# Patient Record
Sex: FEMALE | Race: WHITE | Employment: FULL TIME | URBAN - NONMETROPOLITAN AREA
[De-identification: names, ages, dates, MRNs, and addresses within clinical notes are randomized per-mention and may not be internally consistent; named-entity substitution may affect disease eponyms.]

---

## 2022-10-04 ENCOUNTER — HOSPITAL ENCOUNTER (OUTPATIENT)
Age: 42
Discharge: HOME | End: 2022-10-04
Payer: COMMERCIAL

## 2022-10-04 ENCOUNTER — OFFICE VISIT (OUTPATIENT)
Dept: FAMILY MEDICINE CLINIC | Age: 42
End: 2022-10-04
Payer: COMMERCIAL

## 2022-10-04 ENCOUNTER — HOSPITAL ENCOUNTER (OUTPATIENT)
Dept: GENERAL RADIOLOGY | Age: 42
Discharge: HOME | End: 2022-10-04
Payer: COMMERCIAL

## 2022-10-04 VITALS
SYSTOLIC BLOOD PRESSURE: 110 MMHG | HEIGHT: 68 IN | DIASTOLIC BLOOD PRESSURE: 62 MMHG | BODY MASS INDEX: 25.23 KG/M2 | WEIGHT: 166.5 LBS | RESPIRATION RATE: 18 BRPM | HEART RATE: 64 BPM

## 2022-10-04 DIAGNOSIS — N92.6 IRREGULAR MENSTRUAL BLEEDING: ICD-10-CM

## 2022-10-04 DIAGNOSIS — R30.0 DYSURIA: ICD-10-CM

## 2022-10-04 DIAGNOSIS — R10.84 GENERALIZED ABDOMINAL PAIN: Primary | ICD-10-CM

## 2022-10-04 DIAGNOSIS — R53.83 FATIGUE, UNSPECIFIED TYPE: ICD-10-CM

## 2022-10-04 DIAGNOSIS — E55.9 VITAMIN D DEFICIENCY: ICD-10-CM

## 2022-10-04 DIAGNOSIS — Z13.220 SCREENING FOR LIPID DISORDERS: ICD-10-CM

## 2022-10-04 DIAGNOSIS — R10.84 GENERALIZED ABDOMINAL PAIN: ICD-10-CM

## 2022-10-04 LAB
BILIRUBIN, POC: NEGATIVE
BLOOD URINE, POC: NORMAL
CLARITY, POC: CLEAR
COLOR, POC: YELLOW
GLUCOSE URINE, POC: NEGATIVE
KETONES, POC: 1.02
LEUKOCYTE EST, POC: NORMAL
NITRITE, POC: NEGATIVE
PH, POC: 6.5
PROTEIN, POC: NEGATIVE
SPECIFIC GRAVITY, POC: 1.02
UROBILINOGEN, POC: 0.2

## 2022-10-04 PROCEDURE — 81002 URINALYSIS NONAUTO W/O SCOPE: CPT | Performed by: NURSE PRACTITIONER

## 2022-10-04 PROCEDURE — 74018 RADEX ABDOMEN 1 VIEW: CPT

## 2022-10-04 PROCEDURE — 99204 OFFICE O/P NEW MOD 45 MIN: CPT | Performed by: NURSE PRACTITIONER

## 2022-10-04 RX ORDER — CIPROFLOXACIN 500 MG/1
500 TABLET, FILM COATED ORAL 2 TIMES DAILY
Qty: 14 TABLET | Refills: 0 | Status: SHIPPED | OUTPATIENT
Start: 2022-10-04 | End: 2022-10-11

## 2022-10-04 RX ORDER — OMEPRAZOLE 40 MG/1
40 CAPSULE, DELAYED RELEASE ORAL
Qty: 90 CAPSULE | Refills: 1 | Status: SHIPPED | OUTPATIENT
Start: 2022-10-04

## 2022-10-04 SDOH — ECONOMIC STABILITY: FOOD INSECURITY: WITHIN THE PAST 12 MONTHS, YOU WORRIED THAT YOUR FOOD WOULD RUN OUT BEFORE YOU GOT MONEY TO BUY MORE.: NEVER TRUE

## 2022-10-04 SDOH — ECONOMIC STABILITY: FOOD INSECURITY: WITHIN THE PAST 12 MONTHS, THE FOOD YOU BOUGHT JUST DIDN'T LAST AND YOU DIDN'T HAVE MONEY TO GET MORE.: NEVER TRUE

## 2022-10-04 ASSESSMENT — SOCIAL DETERMINANTS OF HEALTH (SDOH): HOW HARD IS IT FOR YOU TO PAY FOR THE VERY BASICS LIKE FOOD, HOUSING, MEDICAL CARE, AND HEATING?: NOT HARD AT ALL

## 2022-10-04 ASSESSMENT — PATIENT HEALTH QUESTIONNAIRE - PHQ9
2. FEELING DOWN, DEPRESSED OR HOPELESS: 0
SUM OF ALL RESPONSES TO PHQ QUESTIONS 1-9: 0
1. LITTLE INTEREST OR PLEASURE IN DOING THINGS: 0
SUM OF ALL RESPONSES TO PHQ QUESTIONS 1-9: 0
SUM OF ALL RESPONSES TO PHQ9 QUESTIONS 1 & 2: 0
SUM OF ALL RESPONSES TO PHQ QUESTIONS 1-9: 0
SUM OF ALL RESPONSES TO PHQ QUESTIONS 1-9: 0

## 2022-10-04 ASSESSMENT — ENCOUNTER SYMPTOMS
WHEEZING: 0
SHORTNESS OF BREATH: 0
COLOR CHANGE: 0
ABDOMINAL PAIN: 1
NAUSEA: 1
BACK PAIN: 0
CONSTIPATION: 0
CHEST TIGHTNESS: 0
ABDOMINAL DISTENTION: 1
SORE THROAT: 0
VOMITING: 0
COUGH: 0
DIARRHEA: 0
SINUS PRESSURE: 0
STRIDOR: 0

## 2022-10-04 NOTE — PROGRESS NOTES
Faustina Abreu (:  1980) is a 37 y.o. female,New patient, here for evaluation of the following chief complaint(s): Annual Exam and Menstrual Problem (Constantly for 2 mo)         ASSESSMENT/PLAN:  1. Generalized abdominal pain  -     XR ABDOMEN (KUB) (SINGLE AP VIEW); Future  -     POCT Urinalysis no Micro  -     US PELVIS COMPLETE; Future  -     Culture, Urine  -     CBC with Auto Differential; Future  -     Comprehensive Metabolic Panel; Future  2. Irregular menstrual bleeding  -     US PELVIS COMPLETE; Future  3. Dysuria  -     Culture, Urine  4. Vitamin D deficiency  -     Vitamin D 25 Hydroxy; Future  5. Screening for lipid disorders  -     Lipid Panel; Future  6. Fatigue, unspecified type  -     TSH; Future    Obtain KUB  Obtain labs  Prilosec  Consider GI referral  UA showed small leuk  Cipro for UTI  Sent urine for culture  Obtain pelvic US  Return for pap in 1 month    Return in about 4 weeks (around 2022). Subjective   SUBJECTIVE/OBJECTIVE:  HPI    Moved recently [facility]. Moved for work. Bloating and epigastric pain. 2 days ago. Feels full. Morning feels fine but then if she eats anything it gets worse. No fevers. Otc gas x. Burning with urination. Started 4-5 days ago. Drinking water. Had UTI's in past.      Had heavy period. Also this last one did not stop. PAP a very long time ago. Irregular bleeding. Hx of vit d def. Not currently taking anything. Was on testosterone in the past for unknown reason. Review of Systems   Constitutional:  Negative for activity change, appetite change, chills, diaphoresis, fatigue, fever and unexpected weight change. HENT:  Negative for congestion, ear pain, postnasal drip, sinus pressure and sore throat. Eyes:  Negative for visual disturbance. Respiratory:  Negative for cough, chest tightness, shortness of breath, wheezing and stridor.     Cardiovascular:  Negative for chest pain, palpitations and leg swelling. Gastrointestinal:  Positive for abdominal distention, abdominal pain and nausea. Negative for constipation, diarrhea and vomiting. Endocrine: Negative for polydipsia, polyphagia and polyuria. Genitourinary:  Positive for dysuria, menstrual problem and vaginal bleeding. Negative for decreased urine volume, difficulty urinating, flank pain, frequency, hematuria, urgency, vaginal discharge and vaginal pain. Musculoskeletal:  Negative for arthralgias, back pain, gait problem, joint swelling, myalgias and neck pain. Skin:  Negative for color change, pallor and rash. Neurological:  Negative for dizziness, syncope, weakness, light-headedness, numbness and headaches. Hematological:  Negative for adenopathy. Psychiatric/Behavioral:  Negative for behavioral problems, self-injury and sleep disturbance. The patient is not nervous/anxious. Objective   Physical Exam  Vitals reviewed. Constitutional:       General: She is not in acute distress. Appearance: Normal appearance. She is well-developed. HENT:      Head: Normocephalic. Right Ear: Tympanic membrane and external ear normal.      Left Ear: Tympanic membrane and external ear normal.      Nose: Nose normal.      Right Sinus: No maxillary sinus tenderness. Left Sinus: No maxillary sinus tenderness. Mouth/Throat:      Mouth: Mucous membranes are moist.      Pharynx: Oropharynx is clear. Uvula midline. No oropharyngeal exudate or posterior oropharyngeal erythema. Neck:      Trachea: Trachea normal.   Cardiovascular:      Rate and Rhythm: Normal rate and regular rhythm. Heart sounds: Normal heart sounds. No murmur heard. Pulmonary:      Effort: Pulmonary effort is normal. No respiratory distress. Breath sounds: Normal breath sounds. No decreased breath sounds, wheezing, rhonchi or rales. Chest:      Chest wall: No tenderness. Abdominal:      General: There is no distension.       Palpations: Abdomen is soft. There is no mass. Tenderness: There is abdominal tenderness. Musculoskeletal:         General: No tenderness or deformity. Normal range of motion. Cervical back: Normal range of motion and neck supple. Lymphadenopathy:      Cervical: No cervical adenopathy. Skin:     General: Skin is warm and dry. Neurological:      Mental Status: She is alert and oriented to person, place, and time. Motor: No abnormal muscle tone. Coordination: Coordination normal.      Gait: Gait normal.   Psychiatric:         Mood and Affect: Mood normal.         Behavior: Behavior normal.         Thought Content: Thought content normal.         Judgment: Judgment normal.          On this date 10/4/2022 I have spent 45 minutes reviewing previous notes, test results and face to face with the patient discussing the diagnosis and importance of compliance with the treatment plan as well as documenting on the day of the visit. An electronic signature was used to authenticate this note.     --ANTHONY Castaneda - CNP

## 2022-10-07 LAB
ORGANISM: ABNORMAL
ORGANISM: ABNORMAL
URINE CULTURE, ROUTINE: ABNORMAL

## 2022-10-10 RX ORDER — NITROFURANTOIN 25; 75 MG/1; MG/1
100 CAPSULE ORAL 2 TIMES DAILY
Qty: 14 CAPSULE | Refills: 0 | Status: SHIPPED | OUTPATIENT
Start: 2022-10-10 | End: 2022-10-17

## 2022-10-21 ENCOUNTER — HOSPITAL ENCOUNTER (OUTPATIENT)
Dept: ULTRASOUND IMAGING | Age: 42
Discharge: HOME | End: 2022-10-21
Payer: COMMERCIAL

## 2022-10-21 DIAGNOSIS — N92.6 IRREGULAR MENSTRUAL BLEEDING: ICD-10-CM

## 2022-10-21 DIAGNOSIS — R10.84 GENERALIZED ABDOMINAL PAIN: ICD-10-CM

## 2022-10-21 PROCEDURE — 76830 TRANSVAGINAL US NON-OB: CPT

## 2022-10-24 ENCOUNTER — PATIENT MESSAGE (OUTPATIENT)
Dept: FAMILY MEDICINE CLINIC | Age: 42
End: 2022-10-24

## 2022-10-24 DIAGNOSIS — N83.202 CYST OF LEFT OVARY: Primary | ICD-10-CM

## 2022-10-24 DIAGNOSIS — N85.8 MASS OF UTERUS: ICD-10-CM

## 2022-10-24 DIAGNOSIS — N83.202 CYST OF LEFT OVARY: ICD-10-CM

## 2022-10-24 DIAGNOSIS — N85.8 MASS OF UTERUS: Primary | ICD-10-CM

## 2022-10-26 ENCOUNTER — HOSPITAL ENCOUNTER (OUTPATIENT)
Age: 42
Discharge: HOME | End: 2022-10-26
Payer: COMMERCIAL

## 2022-10-26 DIAGNOSIS — E55.9 VITAMIN D DEFICIENCY: ICD-10-CM

## 2022-10-26 DIAGNOSIS — R53.83 FATIGUE, UNSPECIFIED TYPE: ICD-10-CM

## 2022-10-26 DIAGNOSIS — R10.84 GENERALIZED ABDOMINAL PAIN: ICD-10-CM

## 2022-10-26 DIAGNOSIS — Z13.220 SCREENING FOR LIPID DISORDERS: ICD-10-CM

## 2022-10-26 LAB
ALBUMIN SERPL-MCNC: 4.2 G/DL (ref 3.5–5.1)
ALP BLD-CCNC: 41 U/L (ref 38–126)
ALT SERPL-CCNC: 10 U/L (ref 11–66)
ANION GAP SERPL CALCULATED.3IONS-SCNC: 10 MEQ/L (ref 8–16)
AST SERPL-CCNC: 16 U/L (ref 5–40)
BASOPHILS # BLD: 0.9 %
BASOPHILS ABSOLUTE: 0.1 THOU/MM3 (ref 0–0.1)
BILIRUB SERPL-MCNC: < 0.2 MG/DL (ref 0.3–1.2)
BUN BLDV-MCNC: 13 MG/DL (ref 7–22)
CALCIUM SERPL-MCNC: 8.9 MG/DL (ref 8.5–10.5)
CHLORIDE BLD-SCNC: 105 MEQ/L (ref 98–111)
CHOLESTEROL, TOTAL: 227 MG/DL (ref 100–199)
CO2: 26 MEQ/L (ref 23–33)
CREAT SERPL-MCNC: 0.8 MG/DL (ref 0.4–1.2)
EOSINOPHIL # BLD: 3.1 %
EOSINOPHILS ABSOLUTE: 0.2 THOU/MM3 (ref 0–0.4)
ERYTHROCYTE [DISTWIDTH] IN BLOOD BY AUTOMATED COUNT: 15.6 % (ref 11.5–14.5)
ERYTHROCYTE [DISTWIDTH] IN BLOOD BY AUTOMATED COUNT: 54 FL (ref 35–45)
GFR SERPL CREATININE-BSD FRML MDRD: > 60 ML/MIN/1.73M2
GLUCOSE BLD-MCNC: 79 MG/DL (ref 70–108)
HCT VFR BLD CALC: 41.2 % (ref 37–47)
HDLC SERPL-MCNC: 61 MG/DL
HEMOGLOBIN: 13.2 GM/DL (ref 12–16)
IMMATURE GRANS (ABS): 0.02 THOU/MM3 (ref 0–0.07)
IMMATURE GRANULOCYTES: 0.3 %
LDL CHOLESTEROL CALCULATED: 150 MG/DL
LYMPHOCYTES # BLD: 35.3 %
LYMPHOCYTES ABSOLUTE: 2.5 THOU/MM3 (ref 1–4.8)
MCH RBC QN AUTO: 30.1 PG (ref 26–33)
MCHC RBC AUTO-ENTMCNC: 32 GM/DL (ref 32.2–35.5)
MCV RBC AUTO: 94.1 FL (ref 81–99)
MONOCYTES # BLD: 7.7 %
MONOCYTES ABSOLUTE: 0.5 THOU/MM3 (ref 0.4–1.3)
NUCLEATED RED BLOOD CELLS: 0 /100 WBC
PLATELET # BLD: 317 THOU/MM3 (ref 130–400)
PMV BLD AUTO: 11.9 FL (ref 9.4–12.4)
POTASSIUM SERPL-SCNC: 4 MEQ/L (ref 3.5–5.2)
RBC # BLD: 4.38 MILL/MM3 (ref 4.2–5.4)
SEG NEUTROPHILS: 52.7 %
SEGMENTED NEUTROPHILS ABSOLUTE COUNT: 3.7 THOU/MM3 (ref 1.8–7.7)
SODIUM BLD-SCNC: 141 MEQ/L (ref 135–145)
TOTAL PROTEIN: 6.7 G/DL (ref 6.1–8)
TRIGL SERPL-MCNC: 80 MG/DL (ref 0–199)
TSH SERPL DL<=0.05 MIU/L-ACNC: 1.81 UIU/ML (ref 0.4–4.2)
VITAMIN D 25-HYDROXY: 23 NG/ML (ref 30–100)
WBC # BLD: 7.1 THOU/MM3 (ref 4.8–10.8)

## 2022-10-26 PROCEDURE — 84443 ASSAY THYROID STIM HORMONE: CPT

## 2022-10-26 PROCEDURE — 80053 COMPREHEN METABOLIC PANEL: CPT

## 2022-10-26 PROCEDURE — 36415 COLL VENOUS BLD VENIPUNCTURE: CPT

## 2022-10-26 PROCEDURE — 85025 COMPLETE CBC W/AUTO DIFF WBC: CPT

## 2022-10-26 PROCEDURE — 82306 VITAMIN D 25 HYDROXY: CPT

## 2022-10-26 PROCEDURE — 80061 LIPID PANEL: CPT

## 2022-11-14 ASSESSMENT — SOCIAL DETERMINANTS OF HEALTH (SDOH)
WITHIN THE LAST YEAR, HAVE YOU BEEN AFRAID OF YOUR PARTNER OR EX-PARTNER?: NO
WITHIN THE LAST YEAR, HAVE YOU BEEN KICKED, HIT, SLAPPED, OR OTHERWISE PHYSICALLY HURT BY YOUR PARTNER OR EX-PARTNER?: NO
WITHIN THE LAST YEAR, HAVE YOU BEEN HUMILIATED OR EMOTIONALLY ABUSED IN OTHER WAYS BY YOUR PARTNER OR EX-PARTNER?: NO
WITHIN THE LAST YEAR, HAVE TO BEEN RAPED OR FORCED TO HAVE ANY KIND OF SEXUAL ACTIVITY BY YOUR PARTNER OR EX-PARTNER?: NO

## 2022-11-16 ENCOUNTER — OFFICE VISIT (OUTPATIENT)
Dept: OBGYN | Age: 42
End: 2022-11-16
Payer: COMMERCIAL

## 2022-11-16 VITALS
OXYGEN SATURATION: 99 % | WEIGHT: 176.6 LBS | BODY MASS INDEX: 26.76 KG/M2 | HEIGHT: 68 IN | DIASTOLIC BLOOD PRESSURE: 78 MMHG | HEART RATE: 80 BPM | SYSTOLIC BLOOD PRESSURE: 124 MMHG

## 2022-11-16 DIAGNOSIS — Z80.41 FAMILY HISTORY OF MALIGNANT NEOPLASM OF OVARY IN FIRST DEGREE RELATIVE: ICD-10-CM

## 2022-11-16 DIAGNOSIS — Z98.890 H/O LEEP: ICD-10-CM

## 2022-11-16 DIAGNOSIS — N92.1 MENORRHAGIA WITH IRREGULAR CYCLE: Primary | ICD-10-CM

## 2022-11-16 DIAGNOSIS — Z80.49 FAMILY HISTORY OF MALIGNANT NEOPLASM OF UTERUS: ICD-10-CM

## 2022-11-16 DIAGNOSIS — D25.2 SUBSEROUS LEIOMYOMA OF UTERUS: ICD-10-CM

## 2022-11-16 DIAGNOSIS — N94.6 DYSMENORRHEA: ICD-10-CM

## 2022-11-16 DIAGNOSIS — N92.6 IRREGULAR MENSES: ICD-10-CM

## 2022-11-16 PROCEDURE — 99203 OFFICE O/P NEW LOW 30 MIN: CPT | Performed by: OBSTETRICS & GYNECOLOGY

## 2022-11-16 NOTE — PROGRESS NOTES
Binta Wallaceakbar  2022      Kim Thomas is a 37 y.o. female       The patient was seen today. She was here to follow-up regarding her labs and diagnostics ordered at her last visit for the diagnosis of:    ICD-10-CM    1. Menorrhagia with irregular cycle  N92.1 CBC with Auto Differential     TSH with Reflex     HCG, Quantitative, Pregnancy     Protime-INR     APTT     Hemoglobin A1C      2. Subserous and intramural leiomyoma of uterus  D25.2       3. Irregular menses  N92.6       4. Dysmenorrhea  N94.6       5. H/O LEEP at 16 yo  Z98.890       6. Family history of malignant neoplasm of ovary in first degree relative (MOTHER)  [address] [place] [address]      7. Family history of malignant neoplasm of uterus(SISTER @36 yo)  [address] [place] [address] 125          Her bowels are regular and she is voiding without difficulty. Pt with heavy irregular menses. Known fibroid uterus with ultrasound. Pt states current normal mammogram in [place]-unavailable will try to obtain. Pt has a significant family hx with a Mother living with Ovarian cancer Chemo and radiation tx and a Sister with Uterine Cancer at 44 yo living-chemo only. Pt instructed to try to get BRCA and Colaris results respectively. Literature given. Pt had a hx of a LEEP at 16 yo with delinquent GYN fu. No pap for more than 5 years. Symptoms of her abnomal menses are worsening over the last 18 months.       Past Medical History:   Diagnosis Date    Abnormal Pap smear of cervix          Past Surgical History:   Procedure Laterality Date    LEEP           Family History   Problem Relation Age of Onset    Cervical Cancer Mother     Cervical Cancer Sister          Social History     Tobacco Use    Smoking status: Every Day     Packs/day: 0.50     Types: Cigarettes    Smokeless tobacco: Never   Vaping Use    Vaping Use: Never used   Substance Use Topics    Alcohol use: Yes     Comment: socially Drug use: Never         MEDICATIONS:  Current Outpatient Medications   Medication Sig Dispense Refill    omeprazole (PRILOSEC) 40 MG delayed release capsule Take 1 capsule by mouth every morning (before breakfast) 90 capsule 1     No current facility-administered medications for this visit. ALLERGIES:  Allergies as of 11/16/2022    (No Known Allergies)       Review Of Systems (11 point):  Constitutional: No fever, chills or malaise; No weight change or fatigue  Head and Eyes: No vision changes, Headache, Dizziness or trauma in last 12 months  ENT ROS: No hearing, Tinnitis, sinus or taste problems  Hematological and Lymphatic ROS:No Lymphoma, Von Willebrand's, Hemophillia or Bleeding History  Psych ROS: No Depression, Homicidal thoughts,suicidal thoughts, or anxiety  Breast ROS: No breast abnormalities or lumps  Respiratory ROS: No SOB, Pneumoniae,Cough, or Pulmonary Embolism   Cardiovascular ROS: No Chest Pain with Exertion, Palpitations, Syncope, Edema, Arrhythmia  Gastrointestinal ROS: No Indigestion, Heartburn, Nausea, vomiting, Diarrhea, Constipation,or Bowel Changes; No Bloody Stools or melena  Genito-Urinary ROS: No Dysuria, Hematuria or Nocturia. No Urinary Incontinence or Vaginal Discharge; + irregular Menses  Musculoskeletal ROS: No Arthralgia, Arthritis,Gout,Osteoporosis or Rheumatism  Neurological ROS: No CVA, Migraines, Epilepsy, Seizure Hx, or Limb Weakness  Dermatological ROS: No Rash, Itching, Hives, Mole Changes or Cancer                                                        Blood pressure 124/78, pulse 80, height 5' 8\" (1.727 m), weight 176 lb 9.6 oz (80.1 kg), last menstrual period 11/06/2022, SpO2 99 %, not currently breastfeeding. Abdomen: Soft non-tender; good bowel sounds. No guarding, rebound or rigidity. No CVA tenderness bilaterally.     Extremities: No calf tenderness, DTR 2/4, and No edema bilaterally    Pelvic: Declined         Diagnostics:  US NON OB TRANSVAGINAL    Result Date: 10/21/2022  PROCEDURE: US NON OB TRANSVAGINAL CLINICAL INFORMATION: Generalized abdominal pain, Irregular menstrual bleeding . TECHNIQUE: Transvaginal ultrasound grayscale and color Doppler imaging COMPARISON: No prior study. FINDINGS: uterus is normal in size. There is however a 4.8 x 4.3 cm heterogeneous isoechoic mass involving the fundus. Slight mass effect on the endometrial canal. Endometrial stripe is normal thickness. No free fluid is seen in the cul-de-sac. The ovaries are normal in size and echogenicity. There is a 2 cm cyst in the left ovary. LMP-irregular - finished 10.03.2022 - lasted 3 wk or more   Uterus - 8.1 x 5.4 x 4.9 cm   Endometrium - 0.6 cm   Right Ovary - 2.2 x 2.8 x 1.4 cm   Left Ovary - 2.5 x 2.1 x 2 cm     Heterogeneous isoechoic mass uterine fundus suggesting a large fibroid. 2 cm cyst left ovary. **This report has been created using voice recognition software. It may contain minor errors which are inherent in voice recognition technology. ** Final report electronically signed by Dr. Hellen De La Paz on 10/21/2022 4:15 PM        Lab Results:  Results for orders placed or performed during the hospital encounter of 10/26/22   Vitamin D 25 Hydroxy   Result Value Ref Range    Vit D, 25-Hydroxy 23 (L) 30 - 100 ng/ml   TSH   Result Value Ref Range    TSH 1.810 0.400 - 4.200 uIU/mL   Comprehensive Metabolic Panel   Result Value Ref Range    Glucose 79 70 - 108 mg/dL    Creatinine 0.8 0.4 - 1.2 mg/dL    BUN 13 7 - 22 mg/dL    Sodium 141 135 - 145 meq/L    Potassium 4.0 3.5 - 5.2 meq/L    Chloride 105 98 - 111 meq/L    CO2 26 23 - 33 meq/L    Calcium 8.9 8.5 - 10.5 mg/dL    AST 16 5 - 40 U/L    Alkaline Phosphatase 41 38 - 126 U/L    Total Protein 6.7 6.1 - 8.0 g/dL    Albumin 4.2 3.5 - 5.1 g/dL    Total Bilirubin <0.2 (L) 0.3 - 1.2 mg/dL    ALT 10 (L) 11 - 66 U/L   CBC with Auto Differential   Result Value Ref Range    WBC 7.1 4.8 - 10.8 thou/mm3    RBC 4.38 4.20 - 5.40 mill/mm3    Hemoglobin 13.2 12.0 - 16.0 gm/dl    Hematocrit 41.2 37.0 - 47.0 %    MCV 94.1 81.0 - 99.0 fL    MCH 30.1 26.0 - 33.0 pg    MCHC 32.0 (L) 32.2 - 35.5 gm/dl    RDW-CV 15.6 (H) 11.5 - 14.5 %    RDW-SD 54.0 (H) 35.0 - 45.0 fL    Platelets 444 523 - 853 thou/mm3    MPV 11.9 9.4 - 12.4 fL    Seg Neutrophils 52.7 %    Lymphocytes 35.3 %    Monocytes 7.7 %    Eosinophils 3.1 %    Basophils 0.9 %    Immature Granulocytes 0.3 %    Segs Absolute 3.7 1.8 - 7.7 thou/mm3    Lymphocytes Absolute 2.5 1.0 - 4.8 thou/mm3    Monocytes Absolute 0.5 0.4 - 1.3 thou/mm3    Eosinophils Absolute 0.2 0.0 - 0.4 thou/mm3    Basophils Absolute 0.1 0.0 - 0.1 thou/mm3    Immature Grans (Abs) 0.02 0.00 - 0.07 thou/mm3    nRBC 0 /100 wbc   Lipid Panel   Result Value Ref Range    Cholesterol, Total 227 (H) 100 - 199 mg/dL    Triglycerides 80 0 - 199 mg/dL    HDL 61 mg/dL    LDL Calculated 150 mg/dL   Glomerular Filtration Rate, Estimated   Result Value Ref Range    Est, Glom Filt Rate >60 >60 ml/min/1.73m2   Anion Gap   Result Value Ref Range    Anion Gap 10.0 8.0 - 16.0 meq/L           Assessment:   Diagnosis Orders   1. Menorrhagia with irregular cycle  CBC with Auto Differential    TSH with Reflex    HCG, Quantitative, Pregnancy    Protime-INR    APTT    Hemoglobin A1C      2. Subserous and intramural leiomyoma of uterus        3. Irregular menses        4. Dysmenorrhea        5. H/O LEEP at 18 yo        6. Family history of malignant neoplasm of ovary in first degree relative (MOTHER)  [address] [place] [address]      7. Family history of malignant neoplasm of uterus(SISTER @36 yo)  [address] [place], [place] [address]    [place] [address]        Chief Complaint   Patient presents with    Other     Vaginal bleeding, left ovarian cyst, reports pain that begins at the umbilical region to bilateral abd. region. Reports pain in lower back and bilateral lower extremities. Referred by Dr. Katiana Lang. Patient Active Problem List    Diagnosis Date Noted    Subserous and intramural leiomyoma of uterus 11/16/2022     Priority: High    Irregular menses 11/16/2022     Priority: High    Menorrhagia with irregular cycle 11/16/2022     Priority: High    Dysmenorrhea 11/16/2022     Priority: High    Family history of malignant neoplasm of ovary in first degree relative (MOTHER) 11/16/2022     Priority: High    Family history of malignant neoplasm of uterus(SISTER @36 yo) 11/16/2022     Priority: High    H/O LEEP at 16 yo 11/16/2022     Priority: Medium       PLAN:  Return in about 4 weeks (around 12/14/2022[facility]. LABS ordered  Copy of Mammogram from [place]  Try to obtain BRCA and Colaris results from Mother and Sister  PAP and EMB HScope at -Prep reviewed  [address] Referral    Pending genetics and mammogram review recommendations-Conservative medical and surgical vs definitive surgical    Return to the office in 4 weeks. Barrier recommendations and STD counseling was completed  Counseled on preventative health maintenance follow-up. Orders Placed This Encounter   Procedures    CBC with Auto Differential     Standing Status:   Future     Standing Expiration Date:   11/16/2023    TSH with Reflex     Standing Status:   Future     Standing Expiration Date:   11/16/2023    HCG, Quantitative, Pregnancy     Standing Status:   Future     Standing Expiration Date:   11/16/2023    Protime-INR     Standing Status:   Future     Standing Expiration Date:   11/16/2023     Order Specific Question:   Daily Coumadin Dose? Answer:   N    APTT     Standing Status:   Future     Standing Expiration Date:   11/16/2023     Order Specific Question:   Daily Heparin Dose?      Answer:   N    Hemoglobin A1C     Standing Status:   Future     Standing Expiration Date:   12/16/2022         Standing Status:   Future     Standing Expiration Date:   11/16/2023    [facility], [facility]     Referral Priority:   Routine     Referral Type:   Eval and Treat     Referral Reason:   Specialty Services Required     Referred to Provider:   Delmy Davis     Requested Specialty:   Genetic Counselor     Number of Visits Requested:   1             The patient, Mary Rodrigues is a 37 y.o. female, was seen with a total time spent of 30 minutes for the visit on this date of service by the E/M provider. The time component had both face to face and non face to face time spent in determining the total time component. Counseling and education regarding her diagnosis listed below and her options regarding those diagnoses were also included in determining her time component. Diagnosis Orders   1. Menorrhagia with irregular cycle  CBC with Auto Differential    TSH with Reflex    HCG, Quantitative, Pregnancy    Protime-INR    APTT    Hemoglobin A1C      2. Subserous and intramural leiomyoma of uterus        3. Irregular menses        4. Dysmenorrhea        5. H/O LEEP at 16 yo        6. Family history of malignant neoplasm of ovary in first degree relative (MOTHER)  [address] [place] [address]      7. Family history of malignant neoplasm of uterus(SISTER @36 yo)  [address] [place], [place] [address]           The patient had her preventative health maintenance recommendations and follow-up reviewed with her at the completion of her visit.

## 2022-11-28 ENCOUNTER — NURSE ONLY (OUTPATIENT)
Dept: LAB | Age: 42
End: 2022-11-28

## 2022-12-01 LAB — CYTOLOGY THIN PREP PAP: NORMAL

## 2023-01-03 ENCOUNTER — NURSE ONLY (OUTPATIENT)
Dept: LAB | Age: 43
End: 2023-01-03

## 2023-01-06 LAB
APTIMA MEDIA TYPE: NORMAL
CHLAMYDIA TRACHOMATIS AMPLIFIED DET: NEGATIVE
NEISSERIA GONORRHOEAE BY AMP: NEGATIVE
SPECIMEN SOURCE: NORMAL
T. VAGINALIS SPECIMEN SOURCE: NORMAL
TRICHOMONAS VAGINALIS BY NAA: NEGATIVE

## 2023-01-11 ENCOUNTER — TELEPHONE (OUTPATIENT)
Dept: OBGYN | Age: 43
End: 2023-01-11

## 2023-01-11 NOTE — TELEPHONE ENCOUNTER
Spoke with patient regarding cancelling her appt on the 18th. Patient states she does want to reschedule but will call back when she has her work schedule.

## 2023-01-20 ENCOUNTER — TELEPHONE (OUTPATIENT)
Dept: OBGYN | Age: 43
End: 2023-01-20

## 2023-02-02 NOTE — PROGRESS NOTES
Follow all instructions given by your physician  NPO after midnight   Sips of water am of surgery with allowed medications  Bring insurance info and 's license  Wear comfortable clean, loose fitting clothing  No jewelry or contact lenses to be worn day of surgery  No glue on dentures morning of surgery;you will be asked to remove them for surgery. Case for glasses. Shower night before and morning of surgery with a liquid antibacterial soap, dry with fresh clean towel; no lotions, creams or powder. Clean sheets and pillow case on bed night before surgery  Bring medications in original bottles  Bring CPAP/BIPAP machine if you have one ( you may be charged if one is needed in recovery room )    Please refer to the SSI-Surgical Site Infection Flyer you hopefully received in the mail-together we can prevent infections; signs and symptoms reviewed. When discharged be sure you understand how to care for your wound and that you have the phone # to call if you have any concerns or questions about your wound.  needed at discharge and someone over 18 to stay with you for 24 hours overnight (surgery may be cancelled if you don't have this)  Report to [facility] on 2nd floor  If you would become ill prior to surgery, please call the surgeon  May have a visitor with you, we request that you limit to 2 visitors in pre-op area  Masks are recommended but not required, new masks at entrance desk  Call [phone] for any questions    Covid questionnaire Complete; Patient negative for symptoms or exposure. See documentation.

## 2023-02-02 NOTE — PROGRESS NOTES
PAT Call Date: 2/2   Surgery Date: 2/9    Surgeon: Carlota Kelly   Surgery: robotic hyst bso    Is patient from a nursing home? No   Any Isolation Precautions? No   Any Pacemaker or ICD? No If YES, has it been checked recently and where? Has the rep been notified? No     On Snapboard?  No     Hard Copy on Chart  In EPIC Pending/Notes   Consent -   Within 30 days; signed, dated & timed by patient and physician  Unsigned in tracker   [] On Arrival     [] Blood    Additional Consent Needs:     H&P - Within 30 days    [] Physician To Do     [] H&P Update - If H&P is older then 24 hours    Clearance -  Medical, Cardiac, Pulmonary, etc.       Orders - Signed and Dated    Copy Sent to Pharm []    [] Physician To Do    Labs - Within 3 months   11/16  [x] CBC -ok   [x] CMP-ok   [] GFR   [] INR    [] PTT    [] Urine    [] Liver Enzymes    [] Kidney Function    [] MRSA Nasal   [] MSSA      Others:    Radiology Studies-   Within 1 year  N/a  [] Chest X-Ray   [] MRI    [] CT    EKG -   Within 1 year, unless hx of HTN  N/a    Cardiac Workup -   Stress Test, Echo, Cath within 18 months    [] Cath                                [] Stress Test                      [] Echo    [] Holter Monitor    [] ERASMO

## 2023-02-06 ENCOUNTER — HOSPITAL ENCOUNTER (OUTPATIENT)
Age: 43
Discharge: HOME | End: 2023-02-06
Payer: COMMERCIAL

## 2023-02-06 LAB
BASOPHILS ABSOLUTE: 0 THOU/MM3 (ref 0–0.1)
BASOPHILS NFR BLD AUTO: 0.6 %
DEPRECATED RDW RBC AUTO: 53.3 FL (ref 35–45)
EOSINOPHIL NFR BLD AUTO: 4.3 %
EOSINOPHILS ABSOLUTE: 0.3 THOU/MM3 (ref 0–0.4)
ERYTHROCYTE [DISTWIDTH] IN BLOOD BY AUTOMATED COUNT: 15.9 % (ref 11.5–14.5)
HCT VFR BLD AUTO: 41 % (ref 37–47)
HGB BLD-MCNC: 13.2 GM/DL (ref 12–16)
IMM GRANULOCYTES # BLD AUTO: 0.02 THOU/MM3 (ref 0–0.07)
IMM GRANULOCYTES NFR BLD AUTO: 0.3 %
LYMPHOCYTES ABSOLUTE: 2 THOU/MM3 (ref 1–4.8)
LYMPHOCYTES NFR BLD AUTO: 31.5 %
MCH RBC QN AUTO: 29.7 PG (ref 26–33)
MCHC RBC AUTO-ENTMCNC: 32.2 GM/DL (ref 32.2–35.5)
MCV RBC AUTO: 92.3 FL (ref 81–99)
MONOCYTES ABSOLUTE: 0.5 THOU/MM3 (ref 0.4–1.3)
MONOCYTES NFR BLD AUTO: 7.5 %
NEUTROPHILS NFR BLD AUTO: 55.8 %
NRBC BLD AUTO-RTO: 0 /100 WBC
PLATELET # BLD AUTO: 316 THOU/MM3 (ref 130–400)
PMV BLD AUTO: 11.9 FL (ref 9.4–12.4)
RBC # BLD AUTO: 4.44 MILL/MM3 (ref 4.2–5.4)
SEGMENTED NEUTROPHILS ABSOLUTE COUNT: 3.5 THOU/MM3 (ref 1.8–7.7)
WBC # BLD AUTO: 6.3 THOU/MM3 (ref 4.8–10.8)

## 2023-02-06 PROCEDURE — 36415 COLL VENOUS BLD VENIPUNCTURE: CPT

## 2023-02-06 PROCEDURE — 85025 COMPLETE CBC W/AUTO DIFF WBC: CPT

## 2023-02-09 ENCOUNTER — HOSPITAL ENCOUNTER (OUTPATIENT)
Age: 43
Setting detail: OUTPATIENT SURGERY
Discharge: HOME | End: 2023-02-09
Attending: OBSTETRICS & GYNECOLOGY | Admitting: OBSTETRICS & GYNECOLOGY
Payer: COMMERCIAL

## 2023-02-09 ENCOUNTER — ANESTHESIA (OUTPATIENT)
Dept: OPERATING ROOM | Age: 43
End: 2023-02-09
Payer: COMMERCIAL

## 2023-02-09 ENCOUNTER — ANESTHESIA EVENT (OUTPATIENT)
Dept: OPERATING ROOM | Age: 43
End: 2023-02-09
Payer: COMMERCIAL

## 2023-02-09 VITALS
DIASTOLIC BLOOD PRESSURE: 82 MMHG | HEART RATE: 69 BPM | OXYGEN SATURATION: 100 % | WEIGHT: 183.4 LBS | TEMPERATURE: 96.9 F | SYSTOLIC BLOOD PRESSURE: 138 MMHG | RESPIRATION RATE: 18 BRPM | BODY MASS INDEX: 27.8 KG/M2 | HEIGHT: 68 IN

## 2023-02-09 DIAGNOSIS — Z90.710 S/P HYSTERECTOMY WITH OOPHORECTOMY: Primary | ICD-10-CM

## 2023-02-09 DIAGNOSIS — Z90.721 S/P HYSTERECTOMY WITH OOPHORECTOMY: Primary | ICD-10-CM

## 2023-02-09 DIAGNOSIS — D21.9 FIBROIDS: ICD-10-CM

## 2023-02-09 LAB
ABO: NORMAL
ANTIBODY SCREEN: NORMAL
PREGNANCY, URINE: NEGATIVE
RH FACTOR: NORMAL

## 2023-02-09 PROCEDURE — 2500000003 HC RX 250 WO HCPCS: Performed by: NURSE ANESTHETIST, CERTIFIED REGISTERED

## 2023-02-09 PROCEDURE — 7100000010 HC PHASE II RECOVERY - FIRST 15 MIN: Performed by: OBSTETRICS & GYNECOLOGY

## 2023-02-09 PROCEDURE — 2580000003 HC RX 258: Performed by: OBSTETRICS & GYNECOLOGY

## 2023-02-09 PROCEDURE — 81025 URINE PREGNANCY TEST: CPT

## 2023-02-09 PROCEDURE — 88307 TISSUE EXAM BY PATHOLOGIST: CPT

## 2023-02-09 PROCEDURE — S2900 ROBOTIC SURGICAL SYSTEM: HCPCS | Performed by: OBSTETRICS & GYNECOLOGY

## 2023-02-09 PROCEDURE — 3600000019 HC SURGERY ROBOT ADDTL 15MIN: Performed by: OBSTETRICS & GYNECOLOGY

## 2023-02-09 PROCEDURE — 7100000000 HC PACU RECOVERY - FIRST 15 MIN: Performed by: OBSTETRICS & GYNECOLOGY

## 2023-02-09 PROCEDURE — 36415 COLL VENOUS BLD VENIPUNCTURE: CPT

## 2023-02-09 PROCEDURE — 3700000001 HC ADD 15 MINUTES (ANESTHESIA): Performed by: OBSTETRICS & GYNECOLOGY

## 2023-02-09 PROCEDURE — 2580000003 HC RX 258: Performed by: NURSE ANESTHETIST, CERTIFIED REGISTERED

## 2023-02-09 PROCEDURE — 2500000003 HC RX 250 WO HCPCS: Performed by: ANESTHESIOLOGY

## 2023-02-09 PROCEDURE — 6360000002 HC RX W HCPCS: Performed by: NURSE ANESTHETIST, CERTIFIED REGISTERED

## 2023-02-09 PROCEDURE — 7100000011 HC PHASE II RECOVERY - ADDTL 15 MIN: Performed by: OBSTETRICS & GYNECOLOGY

## 2023-02-09 PROCEDURE — 2720000010 HC SURG SUPPLY STERILE: Performed by: OBSTETRICS & GYNECOLOGY

## 2023-02-09 PROCEDURE — 86850 RBC ANTIBODY SCREEN: CPT

## 2023-02-09 PROCEDURE — 3700000000 HC ANESTHESIA ATTENDED CARE: Performed by: OBSTETRICS & GYNECOLOGY

## 2023-02-09 PROCEDURE — 6360000002 HC RX W HCPCS: Performed by: OBSTETRICS & GYNECOLOGY

## 2023-02-09 PROCEDURE — 7100000001 HC PACU RECOVERY - ADDTL 15 MIN: Performed by: OBSTETRICS & GYNECOLOGY

## 2023-02-09 PROCEDURE — 86901 BLOOD TYPING SEROLOGIC RH(D): CPT

## 2023-02-09 PROCEDURE — 2709999900 HC NON-CHARGEABLE SUPPLY: Performed by: OBSTETRICS & GYNECOLOGY

## 2023-02-09 PROCEDURE — 3600000009 HC SURGERY ROBOT BASE: Performed by: OBSTETRICS & GYNECOLOGY

## 2023-02-09 PROCEDURE — 86900 BLOOD TYPING SEROLOGIC ABO: CPT

## 2023-02-09 PROCEDURE — 6370000000 HC RX 637 (ALT 250 FOR IP): Performed by: OBSTETRICS & GYNECOLOGY

## 2023-02-09 RX ORDER — ONDANSETRON 2 MG/ML
INJECTION INTRAMUSCULAR; INTRAVENOUS PRN
Status: DISCONTINUED | OUTPATIENT
Start: 2023-02-09 | End: 2023-02-09 | Stop reason: SDUPTHER

## 2023-02-09 RX ORDER — PROPOFOL 10 MG/ML
INJECTION, EMULSION INTRAVENOUS PRN
Status: DISCONTINUED | OUTPATIENT
Start: 2023-02-09 | End: 2023-02-09 | Stop reason: SDUPTHER

## 2023-02-09 RX ORDER — SODIUM CHLORIDE 0.9 % (FLUSH) 0.9 %
5-40 SYRINGE (ML) INJECTION EVERY 12 HOURS SCHEDULED
Status: DISCONTINUED | OUTPATIENT
Start: 2023-02-09 | End: 2023-02-09 | Stop reason: HOSPADM

## 2023-02-09 RX ORDER — LIDOCAINE HYDROCHLORIDE 20 MG/ML
INJECTION, SOLUTION INTRAVENOUS PRN
Status: DISCONTINUED | OUTPATIENT
Start: 2023-02-09 | End: 2023-02-09 | Stop reason: SDUPTHER

## 2023-02-09 RX ORDER — DEXAMETHASONE SODIUM PHOSPHATE 10 MG/ML
INJECTION, EMULSION INTRAMUSCULAR; INTRAVENOUS PRN
Status: DISCONTINUED | OUTPATIENT
Start: 2023-02-09 | End: 2023-02-09 | Stop reason: SDUPTHER

## 2023-02-09 RX ORDER — SODIUM CHLORIDE 9 MG/ML
INJECTION, SOLUTION INTRAVENOUS PRN
Status: DISCONTINUED | OUTPATIENT
Start: 2023-02-09 | End: 2023-02-09 | Stop reason: HOSPADM

## 2023-02-09 RX ORDER — SODIUM CHLORIDE 9 MG/ML
INJECTION, SOLUTION INTRAVENOUS CONTINUOUS
Status: DISCONTINUED | OUTPATIENT
Start: 2023-02-09 | End: 2023-02-09 | Stop reason: HOSPADM

## 2023-02-09 RX ORDER — ONDANSETRON 2 MG/ML
4 INJECTION INTRAMUSCULAR; INTRAVENOUS EVERY 6 HOURS PRN
Status: DISCONTINUED | OUTPATIENT
Start: 2023-02-09 | End: 2023-02-09 | Stop reason: HOSPADM

## 2023-02-09 RX ORDER — KETOROLAC TROMETHAMINE 30 MG/ML
INJECTION, SOLUTION INTRAMUSCULAR; INTRAVENOUS PRN
Status: DISCONTINUED | OUTPATIENT
Start: 2023-02-09 | End: 2023-02-09 | Stop reason: SDUPTHER

## 2023-02-09 RX ORDER — OXYCODONE HYDROCHLORIDE 5 MG/1
5 TABLET ORAL EVERY 6 HOURS PRN
Qty: 28 TABLET | Refills: 0 | Status: SHIPPED | OUTPATIENT
Start: 2023-02-09 | End: 2023-02-09 | Stop reason: SDUPTHER

## 2023-02-09 RX ORDER — OXYCODONE HYDROCHLORIDE 5 MG/1
5 TABLET ORAL EVERY 6 HOURS PRN
Qty: 28 TABLET | Refills: 0 | Status: SHIPPED | OUTPATIENT
Start: 2023-02-09 | End: 2023-02-16

## 2023-02-09 RX ORDER — MORPHINE SULFATE 2 MG/ML
2 INJECTION, SOLUTION INTRAMUSCULAR; INTRAVENOUS
Status: DISCONTINUED | OUTPATIENT
Start: 2023-02-09 | End: 2023-02-09 | Stop reason: HOSPADM

## 2023-02-09 RX ORDER — SODIUM CHLORIDE 0.9 % (FLUSH) 0.9 %
5-40 SYRINGE (ML) INJECTION PRN
Status: DISCONTINUED | OUTPATIENT
Start: 2023-02-09 | End: 2023-02-09 | Stop reason: HOSPADM

## 2023-02-09 RX ORDER — FENTANYL CITRATE 50 UG/ML
INJECTION, SOLUTION INTRAMUSCULAR; INTRAVENOUS PRN
Status: DISCONTINUED | OUTPATIENT
Start: 2023-02-09 | End: 2023-02-09 | Stop reason: SDUPTHER

## 2023-02-09 RX ORDER — MORPHINE SULFATE 2 MG/ML
4 INJECTION, SOLUTION INTRAMUSCULAR; INTRAVENOUS
Status: DISCONTINUED | OUTPATIENT
Start: 2023-02-09 | End: 2023-02-09 | Stop reason: HOSPADM

## 2023-02-09 RX ORDER — ONDANSETRON 4 MG/1
4 TABLET, ORALLY DISINTEGRATING ORAL EVERY 8 HOURS PRN
Status: DISCONTINUED | OUTPATIENT
Start: 2023-02-09 | End: 2023-02-09 | Stop reason: HOSPADM

## 2023-02-09 RX ORDER — ONDANSETRON 2 MG/ML
4 INJECTION INTRAMUSCULAR; INTRAVENOUS
Status: DISCONTINUED | OUTPATIENT
Start: 2023-02-09 | End: 2023-02-09 | Stop reason: HOSPADM

## 2023-02-09 RX ORDER — MIDAZOLAM HYDROCHLORIDE 1 MG/ML
INJECTION INTRAMUSCULAR; INTRAVENOUS PRN
Status: DISCONTINUED | OUTPATIENT
Start: 2023-02-09 | End: 2023-02-09 | Stop reason: SDUPTHER

## 2023-02-09 RX ORDER — LABETALOL HYDROCHLORIDE 5 MG/ML
10 INJECTION, SOLUTION INTRAVENOUS
Status: DISCONTINUED | OUTPATIENT
Start: 2023-02-09 | End: 2023-02-09 | Stop reason: HOSPADM

## 2023-02-09 RX ORDER — EPHEDRINE SULFATE/0.9% NACL/PF 50 MG/5 ML
SYRINGE (ML) INTRAVENOUS PRN
Status: DISCONTINUED | OUTPATIENT
Start: 2023-02-09 | End: 2023-02-09 | Stop reason: SDUPTHER

## 2023-02-09 RX ORDER — ROCURONIUM BROMIDE 10 MG/ML
INJECTION, SOLUTION INTRAVENOUS PRN
Status: DISCONTINUED | OUTPATIENT
Start: 2023-02-09 | End: 2023-02-09 | Stop reason: SDUPTHER

## 2023-02-09 RX ORDER — SUCCINYLCHOLINE/SOD CL,ISO/PF 200MG/10ML
SYRINGE (ML) INTRAVENOUS PRN
Status: DISCONTINUED | OUTPATIENT
Start: 2023-02-09 | End: 2023-02-09 | Stop reason: SDUPTHER

## 2023-02-09 RX ORDER — KETOROLAC TROMETHAMINE 10 MG/1
10 TABLET, FILM COATED ORAL EVERY 6 HOURS PRN
Qty: 20 TABLET | Refills: 0 | Status: SHIPPED | OUTPATIENT
Start: 2023-02-09 | End: 2024-02-09

## 2023-02-09 RX ORDER — OXYCODONE HYDROCHLORIDE 5 MG/1
10 TABLET ORAL EVERY 4 HOURS PRN
Status: DISCONTINUED | OUTPATIENT
Start: 2023-02-09 | End: 2023-02-09 | Stop reason: HOSPADM

## 2023-02-09 RX ORDER — ACETAMINOPHEN 500 MG
1000 TABLET ORAL EVERY 8 HOURS SCHEDULED
Status: DISCONTINUED | OUTPATIENT
Start: 2023-02-09 | End: 2023-02-09 | Stop reason: HOSPADM

## 2023-02-09 RX ORDER — OXYCODONE HYDROCHLORIDE 5 MG/1
5 TABLET ORAL EVERY 4 HOURS PRN
Status: DISCONTINUED | OUTPATIENT
Start: 2023-02-09 | End: 2023-02-09 | Stop reason: HOSPADM

## 2023-02-09 RX ORDER — HYDRALAZINE HYDROCHLORIDE 20 MG/ML
10 INJECTION INTRAMUSCULAR; INTRAVENOUS
Status: DISCONTINUED | OUTPATIENT
Start: 2023-02-09 | End: 2023-02-09 | Stop reason: HOSPADM

## 2023-02-09 RX ORDER — SODIUM CHLORIDE, SODIUM LACTATE, POTASSIUM CHLORIDE, CALCIUM CHLORIDE 600; 310; 30; 20 MG/100ML; MG/100ML; MG/100ML; MG/100ML
INJECTION, SOLUTION INTRAVENOUS CONTINUOUS PRN
Status: DISCONTINUED | OUTPATIENT
Start: 2023-02-09 | End: 2023-02-09 | Stop reason: SDUPTHER

## 2023-02-09 RX ORDER — IBUPROFEN 600 MG/1
600 TABLET ORAL EVERY 8 HOURS SCHEDULED
Status: DISCONTINUED | OUTPATIENT
Start: 2023-02-09 | End: 2023-02-09 | Stop reason: HOSPADM

## 2023-02-09 RX ADMIN — FENTANYL CITRATE 50 MCG: 50 INJECTION, SOLUTION INTRAMUSCULAR; INTRAVENOUS at 10:48

## 2023-02-09 RX ADMIN — ROCURONIUM BROMIDE 30 MG: 10 SOLUTION INTRAVENOUS at 10:07

## 2023-02-09 RX ADMIN — FENTANYL CITRATE 100 MCG: 50 INJECTION, SOLUTION INTRAMUSCULAR; INTRAVENOUS at 09:58

## 2023-02-09 RX ADMIN — SODIUM CHLORIDE: 9 INJECTION, SOLUTION INTRAVENOUS at 08:24

## 2023-02-09 RX ADMIN — Medication 140 MG: at 09:58

## 2023-02-09 RX ADMIN — PROPOFOL 50 MG: 10 INJECTION, EMULSION INTRAVENOUS at 11:19

## 2023-02-09 RX ADMIN — OXYCODONE 5 MG: 5 TABLET ORAL at 12:45

## 2023-02-09 RX ADMIN — SODIUM CHLORIDE, POTASSIUM CHLORIDE, SODIUM LACTATE AND CALCIUM CHLORIDE: 600; 310; 30; 20 INJECTION, SOLUTION INTRAVENOUS at 10:45

## 2023-02-09 RX ADMIN — LIDOCAINE HYDROCHLORIDE 100 MG: 20 INJECTION INTRAVENOUS at 09:58

## 2023-02-09 RX ADMIN — ROCURONIUM BROMIDE 20 MG: 10 SOLUTION INTRAVENOUS at 11:19

## 2023-02-09 RX ADMIN — SUGAMMADEX 200 MG: 100 INJECTION, SOLUTION INTRAVENOUS at 11:56

## 2023-02-09 RX ADMIN — ONDANSETRON 4 MG: 2 INJECTION INTRAMUSCULAR; INTRAVENOUS at 11:37

## 2023-02-09 RX ADMIN — MIDAZOLAM 2 MG: 1 INJECTION INTRAMUSCULAR; INTRAVENOUS at 09:52

## 2023-02-09 RX ADMIN — Medication 2000 MG: at 10:08

## 2023-02-09 RX ADMIN — FENTANYL CITRATE 50 MCG: 50 INJECTION, SOLUTION INTRAMUSCULAR; INTRAVENOUS at 10:43

## 2023-02-09 RX ADMIN — DEXAMETHASONE SODIUM PHOSPHATE 10 MG: 10 INJECTION, EMULSION INTRAMUSCULAR; INTRAVENOUS at 10:12

## 2023-02-09 RX ADMIN — KETOROLAC TROMETHAMINE 30 MG: 30 INJECTION, SOLUTION INTRAMUSCULAR; INTRAVENOUS at 11:41

## 2023-02-09 RX ADMIN — PROPOFOL 150 MG: 10 INJECTION, EMULSION INTRAVENOUS at 09:58

## 2023-02-09 RX ADMIN — Medication 10 MG: at 10:35

## 2023-02-09 ASSESSMENT — PAIN DESCRIPTION - DESCRIPTORS: DESCRIPTORS: ACHING

## 2023-02-09 ASSESSMENT — PAIN SCALES - GENERAL
PAINLEVEL_OUTOF10: 5
PAINLEVEL_OUTOF10: 4
PAINLEVEL_OUTOF10: 5
PAINLEVEL_OUTOF10: 5
PAINLEVEL_OUTOF10: 0

## 2023-02-09 ASSESSMENT — PAIN - FUNCTIONAL ASSESSMENT: PAIN_FUNCTIONAL_ASSESSMENT: 0-10

## 2023-02-09 ASSESSMENT — PAIN DESCRIPTION - LOCATION: LOCATION: ABDOMEN

## 2023-02-09 ASSESSMENT — PAIN DESCRIPTION - ORIENTATION: ORIENTATION: MID

## 2023-02-09 NOTE — BRIEF OP NOTE
Brief Postoperative Note      Patient: Christen Rodirguez  YOB: 1980  MRN: [id number]    Date of Procedure: 2/9/2023    Pre-Op Diagnosis: Fibroids [D21.9], Menorrhagia, Pelvic pain, family history of uterine cancer    Post-Op Diagnosis: Same       Procedure(s):  Robotic Hysterectomy BSO Cystoscopy    Surgeon(s):  DO Rodney Cazares MD    Assistant:  First Assistant: Milad Dempsey RN    Anesthesia: General    Estimated Blood Loss (mL): 53KC    Complications: Other: Bowel adherent to posterior culdesac    Specimens:   ID Type Source Tests Collected by Time Destination   A : UTERUS, CERVIX, BILATERAL FALLOPIAN TUBES AND OVARIES Tissue Uterus SURGICAL PATHOLOGY Rodney Arredondo MD 2/9/2023 1037        Implants:  * No implants in log *      Drains:   NG/OG/NJ/NE Tube Orogastric 18 fr Center mouth (Active)       Findings: Fibroid uterus, normal appearing tubes and ovaries. Large bowel adherent to the posterior cul-de-sac.     Op note: [id number]    Electronically signed by Rodney Arredondo MD on 2/9/2023 at 11:55 AM

## 2023-02-09 NOTE — PROGRESS NOTES
Pt returned to [facility] room 12. Vitals and assessment as charted. 0.9 infusing, @650ml to count from PACU. Pt has crackers and pop. Family at the bedside. Pt and family verbalized understanding of discharge criteria and call light use. Call light in reach.

## 2023-02-09 NOTE — DISCHARGE SUMMARY
GYN Surgery  Discharge Summary     Patient ID:  Derick Sandhu  [id number]  43 y.o.  1980    Admit date: 2/9/2023    Admitting Physician: Tres De Dios MD    Discharge Diagnoses: Fibroids [D21.9]    Discharged Condition: good      Procedures Performed: robotic hysterectomy BSO cystoscopy    Hospital Course: Patient was admitted on the day of surgery, and underwent an uncomplicated procedure. Dr. Florence Garnica (general surgeon) was consulted intraoperatively due to bowel adherence to the posterior cul-de-sac. See dictated op note. Disposition: home    Patient Instructions: Activity: activity as tolerated, no sex for 6 weeks, no driving while on analgesics, and no heavy lifting for 6 weeks  Diet: regular  Wound Care: as directed    Discharge Medication:      Medication List        START taking these medications      ketorolac 10 MG tablet  Commonly known as: TORADOL  Take 1 tablet by mouth every 6 hours as needed for Pain     oxyCODONE 5 MG immediate release tablet  Commonly known as: Roxicodone  Take 1 tablet by mouth every 6 hours as needed for Pain for up to 7 days. Intended supply: 7 days.  Take lowest dose possible to manage pain Max Daily Amount: 20 mg               Where to Get Your Medications        These medications were sent to [facility] [address], [address]  [address] [place] [address], [address] [address]      Phone: [phone]   ketorolac 10 MG tablet  oxyCODONE 5 MG immediate release tablet          Discharge Date: 2/9/23    Condition: Good    Follow-up with Tres De Dios MD in 1 week    Signed:  Electronically signed by Tres De Dios MD on 2/9/2023 at 11:54 AM

## 2023-02-09 NOTE — DISCHARGE INSTRUCTIONS
DISCHARGE INSTRUCTIONS FOR  PATIENTS AND FAMILY  [facility]  [phone]  [facility]  [address], [address] [place]      Discharge Instructions for [facility] may shower 2 days after surgery. You may bathe/soak in water/swim in 2 weeks. Keep your incision sites clean. Wash your hands before changing the dressing. Do not douche or put anything in your vagina, such as a tampon, until your doctor tells you otherwise. NO INTERCOURSE UNTIL YOU ARE TOLD OTHERWISE. Diet    While in the hospital, you will progress from intravenous fluid to a normal diet. Eat a high-fiber diet to promote healing and prevent constipation . Drink plenty of fluids. Physical Activity    Ask your doctor when you will be able to return to work. You may drive 2 weeks after surgery if you are off narcotics for pain. Return to your normal activities gradually. Most normal activities, including sex, can be resumed in about six weeks. Take daily walks as tolerated. Avoid heavy lifting and strenuous exercise until your doctor gives you permission to do so, usually 4-6 weeks. If you are discharged with a catheter, you will be instructed on home care and when/how to remove it. BE SURE ALL OF YOUR QUESTIONS ARE ANSWERED BEFORE DISCHARGE. Medications    If you had to stop medicines before the procedure, ask your doctor when you can start again. Medicines often stopped include:   Anti-inflammatory drugs (eg, aspirin )   Blood thinners, like clopidogrel (Plavix) or warfarin (Coumadin)   You will likely go home with a prescription for pain medicine. If you had your ovaries removed with your uterus, you may be given an estrogen supplement. Also, to prevent constipation, your doctor may recommend a stool softener. If you are taking medicines, follow these general guidelines:   Take your medicine as directed. Do not change the amount or the schedule.    Do not stop taking them without talking to your doctor. Do not share them. Plan ahead for refills so you do not run out. Lifestyle Changes    You and your doctor will plan lifestyle changes that will aid in your recovery. Some issues that may affect you include: You will no longer have monthly periods, and you can no longer get pregnant. Birth control is not necessary. If your ovaries are removed, you may experience menopausal symptoms, which can be controlled with medicine. You may have a change in your sexual response. If your uterus has been removed, uterine contractions you may have felt during orgasm will no longer occur. If the ovaries have been removed, vaginal dryness may be a problem. Estrogen can help relieve this. You may enjoy sex more because you no longer feel pain from the condition. If you experience a sense of loss or feel depressed after your surgery, it is important to talk to your doctor about these feelings since they can be treated. Follow-up   Schedule a follow-up appointment as directed by your doctor. If you have had a subtotal hysterectomy (meaning you still have your cervix), continue to have regular Pap smears . If you had this procedure because of cancer, other treatment, such as radiation or hormonal therapy, may be used as well. Call Your Doctor If Any of the Following Occurs   It is important for you to monitor your recovery once you leave the hospital. That way, you can alert your doctor to any problems immediately.  If any of the following occurs, call your doctor:   Signs of infection, including fever and chills   Redness, swelling, increasing pain, excessive bleeding, leakage, or any discharge from the incision site   Incision opens up   Nausea and/or vomiting that you cannot control with the medicines you were given after surgery, or which persist for more than two days after discharge from the hospital   Dizziness or fainting   Cough, shortness of breath, or chest pain Heavy bleeding   Pain that you cannot control with the medicines you have been given   Pain, burning, urgency or frequency of urination, or persistent bleeding in the urine   Swelling, redness, or pain in your leg   In case of an emergency,  CALL 911  immediately.    Ellen Lucas MD 2/9/2023 11:53 AM

## 2023-02-09 NOTE — H&P
[address] [address]  History and Physicial      Patient:  Akanksha Merchant  YOB: 1980  MRN: [id number]     Acct: [de-identified]    HISTORY OF PRESENT ILLNESS:     Akanksha Merchant is a 40 y.o. female  with complaints of pelvic pain,  and irregular, heavy cycles. She does have a family history of uterine and cervical cancer. Obstetric History: # 1 - Date: None, Sex: None, Weight: None, GA: None, Delivery: None, Apgar1: None, Apgar5: None, Living: None, Birth Comments: None    # 2 - Date: None, Sex: None, Weight: None, GA: None, Delivery: None, Apgar1: None, Apgar5: None, Living: None, Birth Comments: None      Past Medical History:        Diagnosis Date    Abnormal Pap smear of cervix        Past Surgical History:        Procedure Laterality Date    LEEP      OTHER SURGICAL HISTORY Right     had a needle removed from butt cheek       Medications: Scheduled Meds:   sodium chloride flush  5-40 mL IntraVENous 2 times per day    ceFAZolin (ANCEF) IVPB  2,000 mg IntraVENous On Call to OR     Continuous Infusions:   sodium chloride 125 mL/hr at 23 0824    sodium chloride       PRN Meds:.sodium chloride flush, sodium chloride    Allergies:  Patient has no known allergies. Social History:    reports that she has been smoking cigarettes. She has been smoking an average of 1 pack per day. She has never used smokeless tobacco. She reports that she does not currently use alcohol. She reports that she does not use drugs.     Family History:       Problem Relation Age of Onset    Endometrial Cancer Mother     Endometrial Cancer Sister     Cervical Cancer Sister     Cancer Maternal Grandmother     Cancer Paternal Grandmother        Review of Systems:  General ROS: negative  Respiratory ROS: negative  Cardiovascular ROS: negative  Gastrointestinal ROS: negative  Musculoskeletal ROS: negative  Neurological ROS: negative    Physical Exam:    Vitals:Patient Vitals for the past 24 hrs:   BP Temp Temp src Pulse Resp SpO2 Height Weight   02/09/23 0810 (!) 131/92 97.1 °F (36.2 °C) Temporal 79 18 99 % 5' 8\" (1.727 m) 183 lb 6.4 oz (83.2 kg)          Wt Readings from Last 1 Encounters:   02/09/23 183 lb 6.4 oz (83.2 kg)         General appearance - alert, well appearing, and in no distress  Abdomen - soft, nontender, nondistended, no masses or organomegaly  Pelvic - deferred  Neurological - alert, oriented, normal speech, no focal findings or movement disorder noted  Musculoskeletal - no joint tenderness, deformity or swelling  Extremities - peripheral pulses normal, no pedal edema, no clubbing or cyanosis  Skin - normal coloration and turgor, no rashes, no suspicious skin lesions noted      Review of Labs and Diagnostic Testing:      CBC:   Lab Results   Component Value Date/Time    WBC 6.3 02/06/2023 09:56 AM    RBC 4.44 02/06/2023 09:56 AM    HGB 13.2 02/06/2023 09:56 AM    HCT 41.0 02/06/2023 09:56 AM    MCV 92.3 02/06/2023 09:56 AM     02/06/2023 09:56 AM         Radiology:        Assessment:    Patient Active Problem List   Diagnosis    H/O LEEP at 18 yo    Subserous and intramural leiomyoma of uterus    Irregular menses    Menorrhagia with irregular cycle    Dysmenorrhea    Family history of malignant neoplasm of ovary in first degree relative (MOTHER)    Family history of malignant neoplasm of uterus(SISTER @38 yo)         Plan: 1.Robotic hysteretomy BSO cystoscopy.       Electronically signed by Jovita Martinez MD on 2/9/2023 at 9:23 AM

## 2023-02-09 NOTE — PROGRESS NOTES
ADMITTED TO [facility] AND ORIENTED TO UNIT. SCDS ON. FALL AND ALLERGY BANDS ON. PT VERBALIZED APPROVAL FOR FIRST NAME, LAST INITIAL AND PHYSICIAN NAME ON UNIT WHITEBOARD.

## 2023-02-09 NOTE — PROGRESS NOTES
1205  pt. Awake and oriented on arrival to pacu. Pt. Denies pain. Abdominal sites x 4 with bandaids intact and dry. Ice applied.

## 2023-02-09 NOTE — ANESTHESIA POSTPROCEDURE EVALUATION
Department of Anesthesiology  Postprocedure Note    Patient: Glory Tatum  MRN: [id number]  YOB: 1980  Date of evaluation: 2/9/2023      Procedure Summary     Date: 02/09/23 Room / Location: [address]    Anesthesia Start: 0952 Anesthesia Stop: 1204    Procedure: Robotic Hysterectomy BSO Cystoscopy (Uterus) Diagnosis:       Fibroids      (Fibroids [D21.9])    Surgeons: Jackie Del Angel MD Responsible Provider: Connor Rodrigez MD    Anesthesia Type: general ASA Status: 2          Anesthesia Type: No value filed.     Beatris Phase I: Beatris Score: 10    Beatris Phase II: Beatris Score: 10      Anesthesia Post Evaluation    Patient location during evaluation: PACU  Patient participation: complete - patient participated  Level of consciousness: awake and alert  Airway patency: patent  Nausea & Vomiting: no nausea  Complications: no  Cardiovascular status: blood pressure returned to baseline and hemodynamically stable  Respiratory status: acceptable and spontaneous ventilation  Hydration status: euvolemic

## 2023-02-09 NOTE — PROGRESS NOTES

## 2023-02-10 ENCOUNTER — TELEPHONE (OUTPATIENT)
Dept: FAMILY MEDICINE CLINIC | Age: 43
End: 2023-02-10

## 2023-02-10 NOTE — OP NOTE
[address] [place] [address]                                OPERATIVE REPORT    PATIENT NAME: Arbutus Credit                        :        1980  MED REC NO:   [id number]                           ROOM:  ACCOUNT NO:   [de-identified]                           ADMIT DATE: 2023  PROVIDER:     Kalia Vernon M.D.    DATE OF PROCEDURE:  2023    PREOPERATIVE DIAGNOSES:  1. Fibroids. 2.  Menorrhagia. 3.  Pelvic pain. 4.  Family medical history of uterine cancer. POSTOPERATIVE DIAGNOSES:  1. Fibroids. 2.  Menorrhagia. 3.  Pelvic pain. 4.  Family medical history of uterine cancer. OPERATIONS PERFORMED:  Robotic hysterectomy, bilateral  salpingo-oophorectomy, cystoscopy. SURGEON:  Kalia Vernon MD    CONSULT:  Dakota Banks DO    ANESTHESIA:  General.    ESTIMATED BLOOD LOSS:  50 mL. COMPLICATIONS:  Large bowel was adherent to the posterior cul-de-sac. SPECIMENS:  Uterus, bilateral tubes and ovaries. FINDINGS:  Fibroid uterus, normal-appearing tubes and ovaries. The  large bowel was adherent to the posterior cul-de-sac. This bowel was  just crossing where the green cup was located, so this did need to be  dissected off. OPERATIVE PROCEDURE:  The patient was taken back to the operating room  where general anesthesia was obtained without difficulty. She was then  placed in the dorsal lithotomy position and prepared and draped in  normal sterile fashion. A weighted speculum was placed in the vagina. Fayrene Hockey was used to retract the anterior vaginal wall. A single-tooth  tenaculum was used to grasp the anterior lip of the cervix. 0 Vicryl  stay sutures were placed both on the anterior and posterior lips of the  cervix. Her uterus did sound to about 8 to 9 _____ where the cervix was  then serially dilated. A large VCare device was selected. This was  introduced into the uterus.   The balloon was inflated. The green cup  was tied to the cervix with the previously placed stay sutures. The  blue cup was moved adjacent to the green cup and this did allow for  adequate uterine movement. A Machado catheter was then placed. Her legs  were then lowered. A change of gloves occurred. Attention was turned  to the abdominal portion of the procedure. A supraumbilical incision  was made and entry was gained into the abdominal cavity blindly. There  was confirmation of abdominal placement and insufflation occurred under  direct visualization. Robotic ports were placed about 10 cm lateral to  the supraumbilical port. Both of those were placed under direct  visualization and the left upper quadrant assistant port was placed  under direct visualization as well. The patient was then placed into  steep Trendelenburg. The robot was docked and the left arm was the long  bipolar graspers and the right arm was the monopolar scissors. At that  point, I retired to the robotic console. The uterus was elevated. The  round ligament on the left side was grasped, coagulated, and cut. The  infundibulopelvic ligament on that side was grasped, coagulated, and  cut. The two incisions were brought together and the posterior leaf of  the broad ligament was incised close to the level of the uterosacral  ligament. The anterior leaf of the broad ligament was incised to start  to create the bladder flap. The uterine artery was skeletonized on that  side. In a similar manner on the opposite side, the round ligament was  grasped, coagulated, and cut. The infundibulopelvic ligament was  grasped, coagulated, and cut. Two incisions were then brought together. The posterior leaf of the broad ligament was incised down to nearly the  uterosacral ligament and then on the anterior side, the anterior leaf of  the broad ligament was incised to meet with the other side and the  bladder flap was then dissected off without difficulty. The uterine  artery on the right side was skeletonized, grasped, coagulated, and cut. The uterine artery on the left side was then grasped, coagulated, and  cut. At that point, as we were ready to make the posterior colpotomy  incision, there was a concern that the large bowel may have been  adherent into the posterior cul-de-sac. A sponge stick was placed  rectally which did confirm this. So general surgeon, Dr. Warden John  came in and dissected the large bowel out of the way, so surgery could  proceed. Once that was done, an anterior colpotomy incision was made  and ran circumferentially around the entire cup. The uterus was then  able to be removed. The colpotomy incision was closed using a V-Loc  suture starting on the right side running it to the left side. In the  middle, the bowel was very close and I was not able to grab the  posterior part of the peritoneum due to the location of the bowel, but  otherwise, the colpotomy was closed without difficulty. Hemostasis was  noted. Pelvis was then irrigated. A cystoscopy was performed which  showed no obvious injuries to the bladder and both ureters were spilling  urine, and at that point, the pelvis was then irrigated as well, the  saline remained in the abdomen and air was introduced into the rectum  with no bubbles noted. At that point, the irrigation was removed. Flo was placed over the colpotomy incision. Adequate hemostasis was  noted. All instruments were then removed from the patient. Skin  incisions were closed using 4-0 Vicryl. The patient was then awakened  from general anesthesia. Sponge, lap, and needle counts were correct  x2. She was taken to the recovery room in stable condition.         Anna Richard M.D.    D: 02/09/2023 12:03:36       T: 02/09/2023 12:07:04     [id number]  Job#: [id number]     Doc#: [id number]    CC:

## 2023-02-10 NOTE — TELEPHONE ENCOUNTER
Care Transitions Initial Follow Up Call    Outreach made within 2 business days of discharge: Yes    Patient: Noni Vargas Patient : 1980   MRN: [id number]  Reason for Admission: There are no discharge diagnoses documented for the most recent discharge. Discharge Date: 23       Spoke with: [facility]     Discharge department/facility: [facility]    TCM Interactive Patient Contact:  Was patient able to fill all prescriptions: Yes  Was patient instructed to bring all medications to the follow-up visit: Yes  Is patient taking all medications as directed in the discharge summary?  Yes  Does patient understand their discharge instructions: Yes  Does patient have questions or concerns that need addressed prior to 7-14 day follow up office visit: no    Scheduled appointment with PCP within 7-14 days    Follow Up  Future Appointments   Date Time Provider Patrick Aguilar   2023  3:00 PM [facility] [address]

## 2023-03-03 ENCOUNTER — PATIENT MESSAGE (OUTPATIENT)
Dept: OBGYN | Age: 43
End: 2023-03-03

## 2023-11-13 RX ORDER — OMEPRAZOLE 40 MG/1
40 CAPSULE, DELAYED RELEASE ORAL
Qty: 90 CAPSULE | Refills: 1 | OUTPATIENT
Start: 2023-11-13

## 2023-11-13 NOTE — TELEPHONE ENCOUNTER
Last appointment this department: 10/4/2022  Next appointment this department: Visit date not found    Patient needs an appointment.

## (undated) DEVICE — SURGICEL ENDOSCP APPL

## (undated) DEVICE — PACK PROCEDURE SURG GYN ROBOTIC

## (undated) DEVICE — MARKER,SKIN,WI/RULER AND LABELS: Brand: MEDLINE

## (undated) DEVICE — ELECTRO LUBE IS A SINGLE PATIENT USE DEVICE THAT IS INTENDED TO BE USED ON ELECTROSURGICAL ELECTRODES TO REDUCE STICKING.: Brand: KEY SURGICAL ELECTRO LUBE

## (undated) DEVICE — GLOVE SURG SZ 6 THK91MIL LTX FREE SYN POLYISOPRENE ANTI

## (undated) DEVICE — ARM DRAPE

## (undated) DEVICE — AIRSEAL 8 MM ACCESS PORT AND LOW PROFILE OBTURATOR WITH BLADELESS OPTICAL TIP, 120 MM LENGTH: Brand: AIRSEAL

## (undated) DEVICE — Y-TYPE TUR/BLADDER IRRIGATION SET, REGULATING CLAMP

## (undated) DEVICE — BASIC SINGLE BASIN BTC-LF: Brand: MEDLINE INDUSTRIES, INC.

## (undated) DEVICE — CANNULA SEAL

## (undated) DEVICE — TRI-LUMEN FILTERED TUBE SET WITH ACTIVATED CHARCOAL FILTER: Brand: AIRSEAL

## (undated) DEVICE — SUTURE VCRL + SZ 0 L27IN ABSRB UD CT-1 L36MM 1/2 CIR TAPR VCP260H

## (undated) DEVICE — AGENT HEMSTAT 3GM OXIDIZED REGENERATED CELOS ABSRB FOR CONT (ORDER MULTIPLES OF 5EA)

## (undated) DEVICE — COLUMN DRAPE

## (undated) DEVICE — SUTURE V-LOC 180 SZ 3-0 L9IN ABSRB GRN L26MM V-20 1/2 CIR VLOCL0644

## (undated) DEVICE — VCARE LARGE, UTERINE MANIPULATOR, VAGINAL-CERVICAL-AHLUWALIA'S-RETRACTOR-ELEVATOR: Brand: VCARE

## (undated) DEVICE — SUTURE VCRL + SZ 4-0 L27IN ABSRB WHT FS-2 3/8 CIR REV CUT VCP422H

## (undated) DEVICE — SUTURE V-LOC 180 SZ 2-0 L12IN ABSRB VLT GS-21 L37MM 1/2 CIR VLOCM0315

## (undated) DEVICE — BANDAGE ADH W1XL3IN NAT FAB WVN FLX DURABLE N ADH PD SEAL

## (undated) DEVICE — BLADELESS OBTURATOR: Brand: WECK VISTA

## (undated) DEVICE — TIP COVER ACCESSORY

## (undated) DEVICE — SUTURE VIC COAT BR UD NDL CTB-1 0 27 JB260

## (undated) DEVICE — 40595 XL TRENDELENBURG POSITIONING KIT: Brand: 40595 XL TRENDELENBURG POSITIONING KIT

## (undated) DEVICE — SOLUTION SCRB 4OZ 4% CHG H2O AIDED FOR PREOPERATIVE SKIN

## (undated) DEVICE — POSITIONER HD W8XH4XL8.5IN RASPBERRY FOAM SLT

## (undated) DEVICE — ELECTRODE PT RET AD L9FT HI MOIST COND ADH HYDRGEL CORDED